# Patient Record
Sex: MALE | Race: BLACK OR AFRICAN AMERICAN | NOT HISPANIC OR LATINO | URBAN - METROPOLITAN AREA
[De-identification: names, ages, dates, MRNs, and addresses within clinical notes are randomized per-mention and may not be internally consistent; named-entity substitution may affect disease eponyms.]

---

## 2021-10-27 ENCOUNTER — EMERGENCY (EMERGENCY)
Facility: HOSPITAL | Age: 41
LOS: 1 days | Discharge: HOME | End: 2021-10-27
Attending: EMERGENCY MEDICINE
Payer: MEDICAID

## 2021-10-27 VITALS
TEMPERATURE: 100 F | HEART RATE: 76 BPM | RESPIRATION RATE: 16 BRPM | OXYGEN SATURATION: 95 % | SYSTOLIC BLOOD PRESSURE: 175 MMHG | DIASTOLIC BLOOD PRESSURE: 98 MMHG

## 2021-10-27 PROCEDURE — 99283 EMERGENCY DEPT VISIT LOW MDM: CPT

## 2021-10-27 RX ORDER — IBUPROFEN 200 MG
1 TABLET ORAL
Qty: 15 | Refills: 0
Start: 2021-10-27

## 2021-10-27 RX ORDER — IBUPROFEN 200 MG
600 TABLET ORAL ONCE
Refills: 0 | Status: COMPLETED | OUTPATIENT
Start: 2021-10-27 | End: 2021-10-27

## 2021-10-27 RX ADMIN — Medication 600 MILLIGRAM(S): at 14:49

## 2021-10-27 RX ADMIN — Medication 100 MILLIGRAM(S): at 14:48
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
## 2021-10-27 NOTE — ED PROVIDER NOTE - CARE PROVIDER_API CALL
Angeline Sparrow)  Plastic Surgery; Surgery of the Hand  [address], [address]  [place] [address]  Phone: [phone]  Fax: [phone]  Follow Up Time:
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
## 2021-10-27 NOTE — ED PROVIDER NOTE - PHYSICAL EXAMINATION
Const: AOX3 nontoxic appearing, no apparent respiratory or physical distress. Stable gait   HEENT: NC/AT. Moist mucous membranes.  Eyes: DARRELL. EOMI  Neck: Soft and supple.   Resp: Speaking in full sentences. No evidence of respiratory distress.  Skin: left index finger with lac on the palmar PIP aspect of the left 2nd phalanx 2cm , minimal yellow d.c on the top and erythema . no fusiform erythema , able to flex and extended at distal joint against the resistant, Cr less than 2 sec . radial pulse +2    Neuro: Awake, alert & oriented x 3. Moves all extremities symmetrically.
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
## 2021-10-27 NOTE — ED PROVIDER NOTE - CLINICAL SUMMARY MEDICAL DECISION MAKING FREE TEXT BOX
added doxy and ibuprofen , wound washed with saline and dressing   f,u in hand clinic, pt is refused x-ray as given the offered  wound wash with saline 500cc and scrubbed with chlorhexidine soap and applied dressing

## 2021-10-27 NOTE — ED PROVIDER NOTE - NSFOLLOWUPINSTRUCTIONS_ED_ALL_ED_FT
keep the area dry and clean and keep it cover . and follow with primary care if you don't have follow up in [facility] clinic   wash with warm water and soap and keep it later dry and cover   call and follow up with hand / plastic surgery   Laceration      SEEK IMMEDIATE MEDICAL CARE IF YOU HAVE ANY OF THE FOLLOWING SYMPTOMS: swelling around the wound, worsening pain, worsening drainage from the wound, red streaking going away from your wound, inability to move finger or toe near the laceration, or discoloration of skin near the laceration.      Wound Infection    WHAT YOU NEED TO KNOW:    A wound infection occurs when bacteria enters a break in the skin. The infection may involve just the skin, or affect deeper tissues or organs close to the wound.     DISCHARGE INSTRUCTIONS:    Seek care immediately if:   •You feel short of breath.       •Your heart is beating faster than usual.       •You feel confused.       •Blood soaks through your bandages.      •Your wound comes apart or feels like it is ripping.       •You have severe pain.      •You see red streaks coming from the infected area.      Contact your healthcare provider if:   •You have a fever or chills.       •You have more pain, redness, or swelling near your wound.      •Your symptoms do not improve.       •The skin around your wound feels numb.      •You have questions or concerns about your condition or care.      Medicines: You may need any of the following:   •NSAIDs, such as ibuprofen, help decrease swelling, pain, and fever. This medicine is available with or without a doctor's order. NSAIDs can cause stomach bleeding or kidney problems in certain people. If you take blood thinner medicine, always ask your healthcare provider if NSAIDs are safe for you. Always read the medicine label and follow directions.      •Antibiotics help treat a bacterial infection.       •Take your medicine as directed. Contact your healthcare provider if you think your medicine is not helping or if you have side effects. Tell him or her if you are allergic to any medicine. Keep a list of the medicines, vitamins, and herbs you take. Include the amounts, and when and why you take them. Bring the list or the pill bottles to follow-up visits. Carry your medicine list with you in case of an emergency.      Care for your wound as directed: Keep your wound clean and dry. You may need to cover your wound when you bathe so it does not get wet. Clean your wound as directed with soap and water or wound . Put on new, clean bandages as directed. Change your bandages when they get wet or dirty.    Help your wound heal:   •Eat a variety of healthy foods. Examples include fruits, vegetables, whole-grain breads, low-fat dairy products, beans, lean meats, and fish. Healthy foods may help you heal faster. You may also need to take vitamins and minerals. Ask if you need to be on a special diet.      •Manage other health conditions. Follow your healthcare provider's directions to manage health conditions that can cause slow wound healing. Examples include high blood pressure and diabetes.      •Do not smoke. Nicotine and other chemicals in cigarettes and cigars can cause slow wound healing. Ask your healthcare provider for information if you currently smoke and need help to quit. E-cigarettes or smokeless tobacco still contain nicotine. Talk to your healthcare provider before you use these products.       Follow up with your healthcare provider in 1 to 2 days: Write down your questions so you remember to ask them during your visits.       © Copyright [employer] 2021

## 2021-10-27 NOTE — ED PROVIDER NOTE - PATIENT PORTAL LINK FT
You can access the FollowMyHealth Patient Portal offered by [facility] by registering at the following website: [email or web address]. By joining [employer]’s FollowMyHealth portal, you will also be able to view your health information using other applications (apps) compatible with our system.

## 2021-12-11 ENCOUNTER — EMERGENCY (EMERGENCY)
Facility: HOSPITAL | Age: 41
LOS: 1 days | Discharge: HOME | End: 2021-12-11
Attending: EMERGENCY MEDICINE
Payer: COMMERCIAL

## 2021-12-11 VITALS
SYSTOLIC BLOOD PRESSURE: 141 MMHG | HEART RATE: 80 BPM | TEMPERATURE: 98 F | DIASTOLIC BLOOD PRESSURE: 93 MMHG | OXYGEN SATURATION: 98 % | RESPIRATION RATE: 18 BRPM

## 2021-12-11 PROBLEM — Z78.9 OTHER SPECIFIED HEALTH STATUS: Chronic | Status: ACTIVE | Noted: 2021-10-27

## 2021-12-11 PROCEDURE — 73130 X-RAY EXAM OF HAND: CPT | Mod: 26,LT

## 2021-12-11 PROCEDURE — 12002 RPR S/N/AX/GEN/TRNK2.6-7.5CM: CPT

## 2021-12-11 PROCEDURE — 73130 X-RAY EXAM OF HAND: CPT

## 2021-12-11 PROCEDURE — 99283 EMERGENCY DEPT VISIT LOW MDM: CPT | Mod: 25

## 2021-12-11 PROCEDURE — 99284 EMERGENCY DEPT VISIT MOD MDM: CPT | Mod: 25

## 2021-12-11 RX ORDER — IBUPROFEN 200 MG
600 TABLET ORAL ONCE
Refills: 0 | Status: COMPLETED | OUTPATIENT
Start: 2021-12-11 | End: 2021-12-11

## 2021-12-11 RX ADMIN — Medication 600 MILLIGRAM(S): at 04:17

## 2021-12-11 NOTE — ED PROVIDER NOTE - CARE PROVIDER_API CALL
Jayson Raymond (DO)  Orthopaedic Surgery  [address] [place] [address]  Phone: [phone]  Fax: [phone]  Follow Up Time:
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
## 2021-12-11 NOTE — ED PROVIDER NOTE - CLINICAL SUMMARY MEDICAL DECISION MAKING FREE TEXT BOX
laceration of the left hand extending from webbing of 2nd digit down the distal 3rd metacarpal, no bone involvement, no vascular injury, FROM of the hand, xray negative, up to date with tetanus, f/u with hand, lac repaired
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
## 2021-12-11 NOTE — ED PROVIDER NOTE - ATTENDING CONTRIBUTION TO CARE
I, Kimberley Jordan, performed a face to face bedside interview with this patient regarding history of present illness, review of symptoms and relevant past medical, social and family history.  I completed an independent physical examination. Medical decision making, follow-up on ordered tests (ie labs, radiologic studies) and re-evaluation of the patient's status has been communicated to the ACP.  Disposition of the patient will be based on test outcome and response to ED interventions.     lac repaire,d no tendon or vascular involvement, xray negative, patient deemed fit for confinement

## 2021-12-11 NOTE — ED PROVIDER NOTE - NSFOLLOWUPINSTRUCTIONS_ED_ALL_ED_FT

## 2021-12-11 NOTE — ED PROVIDER NOTE - PATIENT PORTAL LINK FT
You can access the FollowMyHealth Patient Portal offered by [facility] by registering at the following website: [email or web address]. By joining [employer]’s FollowMyHealth portal, you will also be able to view your health information using other applications (apps) compatible with our system.

## 2022-04-05 ENCOUNTER — EMERGENCY (EMERGENCY)
Facility: HOSPITAL | Age: 42
LOS: 1 days | Discharge: HOME | End: 2022-04-05
Admitting: EMERGENCY MEDICINE
Payer: OTHER GOVERNMENT

## 2022-04-05 PROCEDURE — 99283 EMERGENCY DEPT VISIT LOW MDM: CPT

## 2022-04-05 PROCEDURE — 73080 X-RAY EXAM OF ELBOW: CPT | Mod: 26,RT

## 2022-04-06 DIAGNOSIS — M70.21 OLECRANON BURSITIS, RIGHT ELBOW: ICD-10-CM

## 2022-04-06 DIAGNOSIS — S50.311A ABRASION OF RIGHT ELBOW, INITIAL ENCOUNTER: ICD-10-CM

## 2022-04-06 DIAGNOSIS — I10 ESSENTIAL (PRIMARY) HYPERTENSION: ICD-10-CM
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
## 2022-04-06 DIAGNOSIS — M25.521 PAIN IN RIGHT ELBOW: ICD-10-CM
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
